# Patient Record
Sex: FEMALE | Race: WHITE | NOT HISPANIC OR LATINO | Employment: STUDENT | URBAN - METROPOLITAN AREA
[De-identification: names, ages, dates, MRNs, and addresses within clinical notes are randomized per-mention and may not be internally consistent; named-entity substitution may affect disease eponyms.]

---
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
## 2025-06-20 RX ORDER — ZINC SULFATE 50(220)MG
220 CAPSULE ORAL DAILY
COMMUNITY
End: 2025-06-23 | Stop reason: ALTCHOICE

## 2025-06-20 NOTE — PROGRESS NOTES
Name: Nat Rees      : 2005      MRN: [id number]  Encounter Provider: Abi Rawls PA-C  Encounter Date: 2025   Encounter department: [facility]  :  Assessment & Plan  Encounter for gynecological examination (general) (routine) without abnormal findings  -Patient is doing well today, she is interested in hormonal IUD, pamphlets provided. Advised to take 600-800 mg of ibuprofen prior to insertion and to abstain from intercourse for 2 weeks prior to insertion.  -Pap done today   -STI testing ordered today  -Colonoscopy due age 44  -Perineal hygiene reviewed. Weight bearing exercises minium of 150 mins/weekly advised. Kegel exercises recommended. SBE encouraged, A yearly mammogram is recommended for breast cancer screening starting at age 40. ASCCP guidelines reviewed. Condoms encouraged with all sexual activity to prevent STI's. Gardisil vaccines recommended up to age 45. Calcium/ Vit D dietary requirements discussed.   -Advised to call with any issues, all concerns & questions addressed.   -See provided information in your after visit summary     RTO one year for yearly exam or sooner as needed.       Screening for cervical cancer    Orders:    Liquid-based pap, screening    Screening for STDs (sexually transmitted diseases)    Orders:    Chlamydia/GC amplified DNA by PCR    HIV 1/2 AB/AG w Reflex [facility] for 2 yr old and above; Future    Hepatitis B surface antigen; Future    Hepatitis C antibody; Future    RPR-Syphilis Screening (Total Syphilis IGG/IGM); Future        __________________________________________________________________    History of Present Illness   HPI  Nat Rees is a 20 y.o. No obstetric history on file. presenting for annual exam.     SCREENING  Last Pap: Not on file     GYN    LMP: 2025  Periods are regular every 28-30 days, lasting 4 -5 days.  Dysmenorrhea:severe, occurring first 1-2 days of flow.   Cyclic symptoms  include none    Sexually active: Yes - single partner - male  Concerns: denies pain, bleeding, dryness   Contraception: No interested in IUD  STI Testing: Yes     Hx Abnormal pap: first pap today  We reviewed ASCCP guidelines for Pap testing today.  Gardasil: She has completed the Gardasil series.    Denies vaginal discharge, itching, odor, dyspareunia, pelvic pain and vulvar/vaginal symptoms    OB  No obstetric history on file.       Complaints: denies   Denies urgency, frequency, hematuria, leakage / change in stream, difficulty urinating.     BREAST  Complaints: denies   Denies: breast lump, breast tenderness, nipple discharge, skin color change, and skin lesion(s)    Pertinent Family Hx:   Family hx of breast cancer: no  Family hx of ovarian cancer: no  Family hx of colon cancer: no  Family hx of uterine cancer: no    Review of Systems   Constitutional: Negative.    Respiratory: Negative.     Cardiovascular: Negative.    Gastrointestinal: Negative.    Genitourinary: Negative.    Psychiatric/Behavioral: Negative.         Past Medical History[1]    Current Medications[2]     Social History     Socioeconomic History    Marital status: Single     Spouse name: Not on file    Number of children: Not on file    Years of education: Not on file    Highest education level: Not on file   Occupational History    Not on file   Tobacco Use    Smoking status: Not on file    Smokeless tobacco: Not on file   Substance and Sexual Activity    Alcohol use: Not on file    Drug use: Not on file    Sexual activity: Not on file   Other Topics Concern    Not on file   Social History Narrative    Not on file     Social Drivers of Health     Financial Resource Strain: Patient Declined (7/18/2023)    Received from [facility]    Overall Financial Resource Strain (CARDIA)     Difficulty of Paying Living Expenses: Patient declined   Food Insecurity: Patient Declined (7/18/2023)    Received from [facility]     Hunger Vital Sign     Within the past 12 months, you worried that your food would run out before you got the money to buy more.: Patient declined     Within the past 12 months, the food you bought just didn't last and you didn't have money to get more.: Patient declined   Transportation Needs: Patient Declined (7/18/2023)    Received from [facility]    PRAPARE - Transportation     Lack of Transportation (Medical): Patient declined     Lack of Transportation (Non-Medical): Patient declined   Physical Activity: Not on file   Stress: Not on file   Social Connections: Unknown (6/18/2024)    Received from [facility]     How often do you feel lonely or isolated from those around you? (Adult - for ages 18 years and over): Not on file   Intimate Partner Violence: Not on file   Housing Stability: Unknown (7/18/2023)    Received from [facility]    Housing Stability Vital Sign     Unable to Pay for Housing in the Last Year: Patient refused     Number of Places Lived in the Last Year: 2     Unstable Housing in the Last Year: No       Allergies[3]    Past Surgical History[4]    Objective   There were no vitals taken for this visit.     Physical Exam  Constitutional:       General: She is not in acute distress.     Appearance: Normal appearance. She is well-developed and well-groomed. She is not ill-appearing.   Genitourinary:      Bladder, rectum and urethral meatus normal.      No lesions in the vagina.      Right Labia: No rash, tenderness, lesions or skin changes.     Left Labia: No tenderness, lesions, skin changes or rash.     No vaginal discharge, erythema, tenderness or bleeding.      No vaginal prolapse present.     No vaginal atrophy present.       Right Adnexa: not tender, not full and no mass present.     Left Adnexa: not tender, not full and no mass present.     No cervical motion tenderness, discharge, friability, lesion or polyp.      Uterus is not  enlarged, fixed, tender or irregular.      No uterine mass detected.     No urethral tenderness or mass present.      Bladder is not tender.    Breasts:     Breasts are symmetrical.      Right: Normal. Present. No swelling, bleeding, inverted nipple, mass, nipple discharge, skin change, tenderness or breast implant.      Left: Normal. Present. No swelling, bleeding, inverted nipple, mass, nipple discharge, skin change, tenderness or breast implant.   HENT:      Head: Normocephalic and atraumatic.   Neck:      Thyroid: No thyroid mass, thyromegaly or thyroid tenderness.   Pulmonary:      Effort: Pulmonary effort is normal.   Abdominal:      General: Abdomen is flat.   Lymphadenopathy:      Upper Body:      Right upper body: No supraclavicular or axillary adenopathy.      Left upper body: No supraclavicular or axillary adenopathy.     Neurological:      Mental Status: She is alert.     Psychiatric:         Mood and Affect: Mood normal.         Behavior: Behavior normal. Behavior is cooperative.         Thought Content: Thought content normal.         Judgment: Judgment normal.                [1] No past medical history on file.  [2] No current outpatient medications on file.  [3] Not on File  [4] No past surgical history on file.

## 2025-06-23 ENCOUNTER — ANNUAL EXAM (OUTPATIENT)
Dept: OBGYN CLINIC | Facility: CLINIC | Age: 20
End: 2025-06-23
Payer: COMMERCIAL

## 2025-06-23 VITALS — WEIGHT: 114.8 LBS | DIASTOLIC BLOOD PRESSURE: 68 MMHG | SYSTOLIC BLOOD PRESSURE: 106 MMHG

## 2025-06-23 DIAGNOSIS — Z11.3 SCREENING FOR STDS (SEXUALLY TRANSMITTED DISEASES): ICD-10-CM

## 2025-06-23 DIAGNOSIS — Z01.419 ENCOUNTER FOR GYNECOLOGICAL EXAMINATION (GENERAL) (ROUTINE) WITHOUT ABNORMAL FINDINGS: Primary | ICD-10-CM

## 2025-06-23 DIAGNOSIS — Z12.4 SCREENING FOR CERVICAL CANCER: ICD-10-CM

## 2025-06-23 PROCEDURE — 99385 PREV VISIT NEW AGE 18-39: CPT

## 2025-06-23 PROCEDURE — 87491 CHLMYD TRACH DNA AMP PROBE: CPT

## 2025-06-23 PROCEDURE — 87591 N.GONORRHOEAE DNA AMP PROB: CPT

## 2025-06-23 PROCEDURE — G0145 SCR C/V CYTO,THINLAYER,RESCR: HCPCS

## 2025-06-25 LAB
C TRACH DNA SPEC QL NAA+PROBE: NEGATIVE
N GONORRHOEA DNA SPEC QL NAA+PROBE: NEGATIVE

## 2025-06-26 LAB
LAB AP GYN PRIMARY INTERPRETATION: NORMAL
Lab: NORMAL

## 2025-08-12 ENCOUNTER — PROCEDURE VISIT (OUTPATIENT)
Dept: OBGYN CLINIC | Facility: CLINIC | Age: 20
End: 2025-08-12
Payer: COMMERCIAL